# Patient Record
Sex: FEMALE | Race: WHITE | NOT HISPANIC OR LATINO | Employment: FULL TIME | URBAN - METROPOLITAN AREA
[De-identification: names, ages, dates, MRNs, and addresses within clinical notes are randomized per-mention and may not be internally consistent; named-entity substitution may affect disease eponyms.]

---

## 2018-04-16 ENCOUNTER — OFFICE VISIT (OUTPATIENT)
Dept: URGENT CARE | Facility: CLINIC | Age: 38
End: 2018-04-16
Payer: COMMERCIAL

## 2018-04-16 VITALS
SYSTOLIC BLOOD PRESSURE: 143 MMHG | BODY MASS INDEX: 34.53 KG/M2 | WEIGHT: 220 LBS | OXYGEN SATURATION: 99 % | DIASTOLIC BLOOD PRESSURE: 93 MMHG | HEIGHT: 67 IN | HEART RATE: 108 BPM | TEMPERATURE: 98 F

## 2018-04-16 DIAGNOSIS — N39.0 URINARY TRACT INFECTION WITH HEMATURIA, SITE UNSPECIFIED: Primary | ICD-10-CM

## 2018-04-16 DIAGNOSIS — R31.9 URINARY TRACT INFECTION WITH HEMATURIA, SITE UNSPECIFIED: Primary | ICD-10-CM

## 2018-04-16 LAB
BILIRUB UR QL STRIP: NEGATIVE
GLUCOSE UR QL STRIP: NEGATIVE
KETONES UR QL STRIP: NEGATIVE
LEUKOCYTE ESTERASE UR QL STRIP: ABNORMAL
PH, POC UA: 6 (ref 5–8)
POC BLOOD, URINE: POSITIVE
POC NITRATES, URINE: NEGATIVE
PROT UR QL STRIP: NEGATIVE
SP GR UR STRIP: 1.01 (ref 1–1.03)
UROBILINOGEN UR STRIP-ACNC: ABNORMAL (ref 0.1–1.1)

## 2018-04-16 PROCEDURE — 99214 OFFICE O/P EST MOD 30 MIN: CPT | Mod: 25,S$GLB,, | Performed by: FAMILY MEDICINE

## 2018-04-16 PROCEDURE — 81003 URINALYSIS AUTO W/O SCOPE: CPT | Mod: QW,S$GLB,, | Performed by: FAMILY MEDICINE

## 2018-04-16 RX ORDER — PHENAZOPYRIDINE HYDROCHLORIDE 100 MG/1
100 TABLET, FILM COATED ORAL 3 TIMES DAILY PRN
Qty: 9 TABLET | Refills: 0 | Status: SHIPPED | OUTPATIENT
Start: 2018-04-16 | End: 2018-04-19

## 2018-04-16 RX ORDER — PHENTERMINE HYDROCHLORIDE 37.5 MG/1
37.5 TABLET ORAL
COMMUNITY

## 2018-04-16 RX ORDER — CIPROFLOXACIN 500 MG/1
500 TABLET ORAL 2 TIMES DAILY
Qty: 14 TABLET | Refills: 0 | Status: SHIPPED | OUTPATIENT
Start: 2018-04-16 | End: 2018-04-23

## 2018-04-16 NOTE — PROGRESS NOTES
"Subjective:       Patient ID: Melissa Santos is a 38 y.o. female.    Vitals:  height is 5' 7" (1.702 m) and weight is 99.8 kg (220 lb). Her oral temperature is 97.8 °F (36.6 °C). Her blood pressure is 143/93 (abnormal) and her pulse is 108. Her oxygen saturation is 99%.     Chief Complaint: Dysuria    Dysuria    This is a new problem. The current episode started today. The problem has been gradually worsening. The quality of the pain is described as burning and aching. The pain is moderate. There has been no fever. There is no history of pyelonephritis. Associated symptoms include frequency and urgency. Pertinent negatives include no chills, hematuria, nausea or vomiting. She has tried nothing for the symptoms. The treatment provided no relief.     Review of Systems   Constitution: Negative for chills and fever.   Skin: Negative for itching.   Musculoskeletal: Negative for back pain.   Gastrointestinal: Negative for abdominal pain, nausea and vomiting.   Genitourinary: Positive for dysuria, frequency and urgency. Negative for genital sores, hematuria, missed menses and non-menstrual bleeding.       Objective:      Physical Exam   Constitutional: She appears well-developed and well-nourished.   HENT:   Head: Normocephalic and atraumatic.   Eyes: EOM are normal. Pupils are equal, round, and reactive to light.   Neck: Normal range of motion. Neck supple.   Cardiovascular: Normal rate, regular rhythm and normal heart sounds.    Pulmonary/Chest: Effort normal and breath sounds normal.   Abdominal: Soft. There is no tenderness (No CVAT).   Nursing note and vitals reviewed.      Results for orders placed or performed in visit on 04/16/18   POCT Urinalysis, Dipstick, Automated, W/O Scope   Result Value Ref Range    POC Blood, Urine Positive (A) Negative    POC Bilirubin, Urine Negative Negative    POC Urobilinogen, Urine norm (NG) 0.1 - 1.1    POC Ketones, Urine Negative Negative    POC Protein, Urine Negative Negative    " POC Nitrates, Urine Negative Negative    POC Glucose, Urine Negative Negative    pH, UA 6.0 5 - 8    POC Specific Gravity, Urine 1.010 1.003 - 1.029    POC Leukocytes, Urine (P) Negative     Assessment:       1. Urinary tract infection with hematuria, site unspecified        Plan:         Urinary tract infection with hematuria, site unspecified  -     POCT Urinalysis, Dipstick, Automated, W/O Scope  -     ciprofloxacin HCl (CIPRO) 500 MG tablet; Take 1 tablet (500 mg total) by mouth 2 (two) times daily.  Dispense: 14 tablet; Refill: 0  -     phenazopyridine (PYRIDIUM) 100 MG tablet; Take 1 tablet (100 mg total) by mouth 3 (three) times daily as needed.  Dispense: 9 tablet; Refill: 0

## 2018-04-16 NOTE — PATIENT INSTRUCTIONS

## 2018-04-20 ENCOUNTER — TELEPHONE (OUTPATIENT)
Dept: URGENT CARE | Facility: CLINIC | Age: 38
End: 2018-04-20

## 2020-06-17 ENCOUNTER — OFFICE VISIT (OUTPATIENT)
Dept: URGENT CARE | Facility: CLINIC | Age: 40
End: 2020-06-17
Payer: COMMERCIAL

## 2020-06-17 VITALS
TEMPERATURE: 97 F | SYSTOLIC BLOOD PRESSURE: 120 MMHG | HEIGHT: 67 IN | HEART RATE: 95 BPM | BODY MASS INDEX: 33.27 KG/M2 | RESPIRATION RATE: 16 BRPM | OXYGEN SATURATION: 99 % | DIASTOLIC BLOOD PRESSURE: 74 MMHG | WEIGHT: 212 LBS

## 2020-06-17 DIAGNOSIS — S89.92XA INJURY OF LEFT LOWER LEG, INITIAL ENCOUNTER: ICD-10-CM

## 2020-06-17 DIAGNOSIS — S89.92XA INJURY OF LEFT KNEE, INITIAL ENCOUNTER: ICD-10-CM

## 2020-06-17 DIAGNOSIS — S89.92XA INJURY OF LEFT PATELLA, INITIAL ENCOUNTER: Primary | ICD-10-CM

## 2020-06-17 PROCEDURE — 73562 X-RAY EXAM OF KNEE 3: CPT | Mod: FY,LT,S$GLB, | Performed by: RADIOLOGY

## 2020-06-17 PROCEDURE — 99214 PR OFFICE/OUTPT VISIT, EST, LEVL IV, 30-39 MIN: ICD-10-PCS | Mod: S$GLB,,, | Performed by: PHYSICIAN ASSISTANT

## 2020-06-17 PROCEDURE — 73590 XR TIBIA FIBULA 2 VIEW LEFT: ICD-10-PCS | Mod: FY,LT,S$GLB, | Performed by: RADIOLOGY

## 2020-06-17 PROCEDURE — 73590 X-RAY EXAM OF LOWER LEG: CPT | Mod: FY,LT,S$GLB, | Performed by: RADIOLOGY

## 2020-06-17 PROCEDURE — 99214 OFFICE O/P EST MOD 30 MIN: CPT | Mod: S$GLB,,, | Performed by: PHYSICIAN ASSISTANT

## 2020-06-17 PROCEDURE — 73562 XR KNEE 3 VIEW LEFT: ICD-10-PCS | Mod: FY,LT,S$GLB, | Performed by: RADIOLOGY

## 2020-06-17 RX ORDER — TRAMADOL HYDROCHLORIDE 50 MG/1
50 TABLET ORAL EVERY 6 HOURS PRN
Qty: 12 TABLET | Refills: 0 | Status: SHIPPED | OUTPATIENT
Start: 2020-06-17 | End: 2020-06-20

## 2020-06-17 RX ORDER — ETODOLAC 400 MG/1
400 TABLET, FILM COATED ORAL 2 TIMES DAILY
Qty: 30 TABLET | Refills: 0 | Status: SHIPPED | OUTPATIENT
Start: 2020-06-17 | End: 2020-07-02

## 2020-06-17 NOTE — LETTER
June 17, 2020      [facility]  [place]  [address]  [place] [address]  Phone: [phone]  Fax: [phone]       Patient: Melissa Santos   YOB: 1980  Date of Visit: 06/17/2020    To Whom It May Concern:    Keegan Santos  was at [facility] on 06/17/2020. She may return to work on 6/18/2020 with no restrictions. If you have any questions or concerns, or if I can be of further assistance, please do not hesitate to contact me.    Sincerely,    Raymond Sol PA-C

## 2020-06-17 NOTE — LETTER
June 17, 2020      [facility]  [place]  [address]  [facility] [place] [address]  Phone: [phone]  Fax: [phone]       Patient: Melissa Santos   YOB: 1980  Date of Visit: 06/17/2020    To Whom It May Concern:    Keegan Santos  was at [facility] on 06/17/2020. She may return to work on 6/23/2020 with no restrictions. If you have any questions or concerns, or if I can be of further assistance, please do not hesitate to contact me.    Sincerely,    Raymond Sol PA-C

## 2020-06-17 NOTE — PATIENT INSTRUCTIONS
· Follow up with your primary care if symptoms do not improve, or you may return here at any time.  · If you were referred to a specialist, please follow up with that specialty.  · If you were prescribed antibiotics, please take them to completion.  · If you were prescribed a narcotic or any medication with sedative effects, do not drive or operate heavy equipment or machinery while taking these medications.  · You must understand that you have received treatment at an Urgent Care facility only, and that you may be released before all of your medical problems are known or treated. Urgent Care facilities are not equipped to handle life threatening emergencies. It is recommended that you seek care at an Emergency Department for further evaluation of worsening or concerning symptoms, or possibly life threatening conditions as discussed.                                        If you  smoke, please stop smoking               PLEASE PRACTICE SOCIAL DISTANCING            Reducing Knee Pain and Swelling    Many treatments can help reduce pain and swelling in your knee. Your healthcare provider or physical therapist may suggest one or more of the following treatments:  · Icing your knee helps reduce swelling. You may be asked to ice your knee once a day or more. Apply ice for about 15 to 20 minutes at a time, with at least 40 minutes between sessions. Always keep a towel between the ice and your skin.   · Keeping your leg raised above your heart helps excess fluid flow out of your knee joint. This reduces swelling.  · Compression means wrapping an elastic bandage or neoprene sleeve snugly around your knees. This keeps fluid from collecting in your knee joint.  · Electrical stimulation, done by a physical therapist or , can help reduce excess fluid in your knee joint.  · Anti-inflammatory medicines may be prescribed by your healthcare provider. You may take pills or receive injections in your  knee.  · Isometric (benny) exercises strengthen the muscles that support your knee joint. They also help reduce excess fluid in your knee.  · Massage helps fluid drain away from your knee.  Date Last Reviewed: 10/13/2015  © 1284-6825 [employer]. [address] [place] [address], [place] [address]. All rights reserved. This information is not intended as a substitute for professional medical care. Always follow your healthcare professional's instructions.

## 2020-06-17 NOTE — PROGRESS NOTES
"Subjective:       Patient ID: Melissa Santos is a 40 y.o. female.    Vitals:  height is 5' 7" (1.702 m) and weight is 96.2 kg (212 lb). Her tympanic temperature is 96.7 °F (35.9 °C). Her blood pressure is 120/74 and her pulse is 95. Her respiration is 16 and oxygen saturation is 99%.     Chief Complaint: Leg Injury (Left)    Leg Pain   The incident occurred more than 1 week ago (Pt c/o left knee pain with swelling, radiating to ankle. State's fell off golf cart x1 month. Limited range of motion (intermittently). ). The incident occurred at home. There was no injury mechanism. The pain is present in the left knee and left ankle. The quality of the pain is described as aching. The pain is at a severity of 5/10. The pain is moderate. The pain has been constant since onset. The symptoms are aggravated by movement. She has tried NSAIDs (motrin ) for the symptoms. The treatment provided no relief.       Constitution: Negative. Negative for fatigue.   HENT: Negative.  Negative for facial swelling and facial trauma.    Neck: negative. Negative for neck stiffness.   Cardiovascular: Negative.  Negative for chest trauma.   Eyes: Negative.  Negative for eye trauma, double vision and blurred vision.   Respiratory: Negative.    Gastrointestinal: Negative.  Negative for abdominal trauma, abdominal pain and rectal bleeding.   Endocrine: negative.   Genitourinary: Negative.  Negative for hematuria, missed menses, genital trauma and pelvic pain.   Musculoskeletal: Positive for pain, trauma, joint pain, joint swelling and abnormal ROM of joint.   Skin: Negative.  Negative for color change, wound, abrasion, laceration and bruising.   Allergic/Immunologic: Negative.    Neurological: Negative for dizziness, history of vertigo, light-headedness, coordination disturbances, altered mental status and loss of consciousness.   Hematologic/Lymphatic: Negative.  Negative for history of bleeding disorder.   Psychiatric/Behavioral: Negative.  " Negative for altered mental status.       Objective:      Physical Exam   Constitutional: She is oriented to person, place, and time. She appears well-developed. She is cooperative.  Non-toxic appearance. She does not appear ill. No distress.   HENT:   Head: Normocephalic and atraumatic. Head is without abrasion, without contusion and without laceration.   Right Ear: Hearing, tympanic membrane, external ear and ear canal normal. No hemotympanum.   Left Ear: Hearing, tympanic membrane, external ear and ear canal normal. No hemotympanum.   Nose: Nose normal. No mucosal edema, rhinorrhea or nasal deformity. No epistaxis. Right sinus exhibits no maxillary sinus tenderness and no frontal sinus tenderness. Left sinus exhibits no maxillary sinus tenderness and no frontal sinus tenderness.   Mouth/Throat: Uvula is midline, oropharynx is clear and moist and mucous membranes are normal. No trismus in the jaw. Normal dentition. No uvula swelling. No posterior oropharyngeal erythema.   Eyes: Pupils are equal, round, and reactive to light. Conjunctivae, EOM and lids are normal. Right eye exhibits no discharge. Left eye exhibits no discharge. No scleral icterus.   Neck: Trachea normal, normal range of motion, full passive range of motion without pain and phonation normal. Neck supple. No spinous process tenderness and no muscular tenderness present. No neck rigidity. No tracheal deviation present.   Cardiovascular: Normal rate, regular rhythm, normal heart sounds and normal pulses.   Pulmonary/Chest: Effort normal and breath sounds normal. No respiratory distress.   Abdominal: Soft. Normal appearance and bowel sounds are normal. She exhibits no distension, no pulsatile midline mass and no mass. There is no abdominal tenderness.   Musculoskeletal:         General: No deformity.      Left knee: She exhibits normal range of motion, no swelling, no effusion, no ecchymosis, no deformity, no laceration, no erythema, normal alignment  and no bony tenderness. No tenderness found.      Left ankle: No tenderness. Achilles tendon normal. Achilles tendon exhibits no pain, no defect and normal Gordon's test results.      Left lower leg: She exhibits tenderness. She exhibits no swelling, no deformity and no laceration. No edema.        Legs:       Comments: Pt walks with antalgic appearing gait    Neurological: She is alert and oriented to person, place, and time. She has normal strength and normal reflexes. No cranial nerve deficit or sensory deficit. She exhibits normal muscle tone. She displays no seizure activity. Coordination normal. GCS eye subscore is 4. GCS verbal subscore is 5. GCS motor subscore is 6.   Skin: Skin is warm, dry, intact, not diaphoretic and not pale. Capillary refill takes less than 2 seconds. not left lower leg and not left kneeabrasion, burn, bruising and ecchymosis  Psychiatric: Her speech is normal and behavior is normal. Judgment and thought content normal.   Nursing note and vitals reviewed.        X-ray Knee 3 View Left    Result Date: 6/17/2020  EXAMINATION: XR KNEE 3 VIEW LEFT CLINICAL HISTORY: Unspecified injury of left lower leg, initial encounter TECHNIQUE: AP, lateral, and Merchant views of the left knee were performed. COMPARISON: None FINDINGS: Bones are well mineralized.  There is slight lateral tilt of the patella on the axial view.  No patella [place] or [place].  No displaced fracture, dislocation or destructive osseous process.  No large suprapatellar joint effusion.  Joint spaces appear relatively maintained.  No subcutaneous emphysema or radiodense retained foreign body.     Slight lateral tilt of the patella which could represent potential recent or remote injury to the medial retinaculum.  Otherwise, no acute displaced fracture-dislocation identified. Electronically signed by: Liban Valencia MD Date:    06/17/2020 Time:    12:06    X-ray Tibia Fibula 2 View Left    Result Date: 6/17/2020  EXAMINATION: XR TIBIA  FIBULA 2 VIEW LEFT CLINICAL HISTORY: Unspecified injury of left lower leg, initial encounter TECHNIQUE: AP and lateral views of the left tibia and fibula were performed. COMPARISON: None. FINDINGS: No fracture or dislocation.  No bone destruction identified     See above Electronically signed by: Trip Steinberg MD Date:    06/17/2020 Time:    12:03      Assessment:       1. Injury of left patella, initial encounter    2. Injury of left knee, initial encounter    3. Injury of left lower leg, initial encounter        Plan:       All hx was provided by the pt or available as part of established EMR. The pt past medical hx, family hx, social hx, and current medications were reviewed. Interpretation of diagnostics performed today were discussed. Pt to follow up with [facility] if no improvement or for any concern, and seek treatment in an ER for worsening. Tx options and relevant risks and benefits discussed. Pt voiced understanding of all discussed, and agreed with decision making.    Injury of left patella, initial encounter  Comments:  possible medial retinaculum, per radiology findings  Orders:  -     Ambulatory referral/consult to Orthopedics  -     KNEE BRACE FOR HOME USE    Injury of left knee, initial encounter  -     X-Ray Tibia Fibula 2 View Left; Future; Expected date: 06/17/2020  -     X-Ray Knee 3 View Left; Future; Expected date: 06/17/2020  -     Ambulatory referral/consult to Orthopedics  -     KNEE BRACE FOR HOME USE  -     etodolac (LODINE) 400 MG tablet; Take 1 tablet (400 mg total) by mouth 2 (two) times daily. for 15 days  Dispense: 30 tablet; Refill: 0  -     traMADoL (ULTRAM) 50 mg tablet; Take 1 tablet (50 mg total) by mouth every 6 (six) hours as needed.  Dispense: 12 tablet; Refill: 0    Injury of left lower leg, initial encounter  -     X-Ray Tibia Fibula 2 View Left; Future; Expected date: 06/17/2020  -     X-Ray Knee 3 View Left; Future; Expected date: 06/17/2020  -     etodolac (LODINE) 400 MG  tablet; Take 1 tablet (400 mg total) by mouth 2 (two) times daily. for 15 days  Dispense: 30 tablet; Refill: 0  -     traMADoL (ULTRAM) 50 mg tablet; Take 1 tablet (50 mg total) by mouth every 6 (six) hours as needed.  Dispense: 12 tablet; Refill: 0      Patient Instructions   · Follow up with your primary care if symptoms do not improve, or you may return here at any time.  · If you were referred to a specialist, please follow up with that specialty.  · If you were prescribed antibiotics, please take them to completion.  · If you were prescribed a narcotic or any medication with sedative effects, do not drive or operate heavy equipment or machinery while taking these medications.  · You must understand that you have received treatment at an Urgent Care facility only, and that you may be released before all of your medical problems are known or treated. Urgent Care facilities are not equipped to handle life threatening emergencies. It is recommended that you seek care at an Emergency Department for further evaluation of worsening or concerning symptoms, or possibly life threatening conditions as discussed.                                        If you  smoke, please stop smoking               PLEASE PRACTICE SOCIAL DISTANCING            Reducing Knee Pain and Swelling    Many treatments can help reduce pain and swelling in your knee. Your healthcare provider or physical therapist may suggest one or more of the following treatments:  · Icing your knee helps reduce swelling. You may be asked to ice your knee once a day or more. Apply ice for about 15 to 20 minutes at a time, with at least 40 minutes between sessions. Always keep a towel between the ice and your skin.   · Keeping your leg raised above your heart helps excess fluid flow out of your knee joint. This reduces swelling.  · Compression means wrapping an elastic bandage or neoprene sleeve snugly around your knees. This keeps fluid from collecting in your knee  joint.  · Electrical stimulation, done by a physical therapist or , can help reduce excess fluid in your knee joint.  · Anti-inflammatory medicines may be prescribed by your healthcare provider. You may take pills or receive injections in your knee.  · Isometric (benny) exercises strengthen the muscles that support your knee joint. They also help reduce excess fluid in your knee.  · Massage helps fluid drain away from your knee.  Date Last Reviewed: 10/13/2015  © 4744-3738 [employer]. [address] [place] [address]. All rights reserved. This information is not intended as a substitute for professional medical care. Always follow your healthcare professional's instructions.

## 2021-05-06 ENCOUNTER — PATIENT MESSAGE (OUTPATIENT)
Dept: RESEARCH | Facility: HOSPITAL | Age: 41
End: 2021-05-06